# Patient Record
Sex: FEMALE | Race: BLACK OR AFRICAN AMERICAN | NOT HISPANIC OR LATINO | URBAN - METROPOLITAN AREA
[De-identification: names, ages, dates, MRNs, and addresses within clinical notes are randomized per-mention and may not be internally consistent; named-entity substitution may affect disease eponyms.]

---

## 2018-07-16 ENCOUNTER — EMERGENCY (EMERGENCY)
Facility: HOSPITAL | Age: 58
LOS: 1 days | Discharge: HOME | End: 2018-07-16
Attending: EMERGENCY MEDICINE
Payer: SELF-PAY

## 2018-07-16 VITALS — WEIGHT: 145.06 LBS

## 2018-07-16 PROCEDURE — 99284 EMERGENCY DEPT VISIT MOD MDM: CPT

## 2018-07-16 NOTE — ED ADULT TRIAGE NOTE - CHIEF COMPLAINT QUOTE
Fall 2 feet from stool in laundromat this afternoon.  Hit head on wall and landed on buttocks.  Denies LOC.  Denies blood thinners.  C/O coccyx pain.

## 2018-07-17 VITALS
TEMPERATURE: 97 F | RESPIRATION RATE: 18 BRPM | OXYGEN SATURATION: 96 % | DIASTOLIC BLOOD PRESSURE: 64 MMHG | SYSTOLIC BLOOD PRESSURE: 121 MMHG | HEART RATE: 77 BPM

## 2018-07-17 PROCEDURE — 70450 CT HEAD/BRAIN W/O DYE: CPT | Mod: 26

## 2018-07-17 PROCEDURE — 99284 EMERGENCY DEPT VISIT MOD MDM: CPT

## 2018-07-17 PROCEDURE — 72125 CT NECK SPINE W/O DYE: CPT

## 2018-07-17 PROCEDURE — 72110 X-RAY EXAM L-2 SPINE 4/>VWS: CPT | Mod: 26

## 2018-07-17 PROCEDURE — 72220 X-RAY EXAM SACRUM TAILBONE: CPT

## 2018-07-17 PROCEDURE — 72110 X-RAY EXAM L-2 SPINE 4/>VWS: CPT

## 2018-07-17 PROCEDURE — 72220 X-RAY EXAM SACRUM TAILBONE: CPT | Mod: 26

## 2018-07-17 PROCEDURE — 72125 CT NECK SPINE W/O DYE: CPT | Mod: 26

## 2018-07-17 PROCEDURE — 73080 X-RAY EXAM OF ELBOW: CPT | Mod: 26,RT

## 2018-07-17 PROCEDURE — 73080 X-RAY EXAM OF ELBOW: CPT

## 2018-07-17 PROCEDURE — 70450 CT HEAD/BRAIN W/O DYE: CPT

## 2018-07-17 RX ORDER — ACETAMINOPHEN 500 MG
975 TABLET ORAL ONCE
Qty: 0 | Refills: 0 | Status: COMPLETED | OUTPATIENT
Start: 2018-07-17 | End: 2018-07-17

## 2018-07-17 RX ORDER — METOPROLOL TARTRATE 50 MG
1 TABLET ORAL
Qty: 0 | Refills: 0 | COMMUNITY

## 2018-07-17 RX ORDER — GLIMEPIRIDE 1 MG
1 TABLET ORAL
Qty: 0 | Refills: 0 | COMMUNITY

## 2018-07-17 RX ADMIN — Medication 975 MILLIGRAM(S): at 02:06

## 2018-07-17 NOTE — ED PROVIDER NOTE - ATTENDING CONTRIBUTION TO CARE
I personally saw the patient with the PA, and completed the key components of the history and physical exam. I then discussed the management plan with the PA.   gen in nad gcs 15 resp clear cardfaci no murmur abd soft neuro: CN II - XII intact, EOMI, PERRL, no papilledema, 5/5 muscle strength x 4 extremities, no sensory deficits, 2+ dtr globally, negative babinski, no ataxic gait, normal GINA and FNT, normal romberg   + passpinal ttp   return to ed for intractable HA, persistent vomiting, or new onset motor/sensory deficits

## 2018-07-17 NOTE — ED ADULT NURSE NOTE - OBJECTIVE STATEMENT
s/p fall in the laundromat fall from 2feet height, hit her buttocks back left side,elbow and the head, denies positive loc

## 2018-07-17 NOTE — ED PROVIDER NOTE - CHPI ED SYMPTOMS NEG
no confusion/no deformity/no vomiting/no weakness/no fever/no loss of consciousness/no bleeding/no abrasion/no numbness/no tingling

## 2018-07-17 NOTE — ED PROVIDER NOTE - OBJECTIVE STATEMENT
PT is a 58y F with PMH of DM and HTN complaining of fall off bin. She states she was at the laundromat and was standing on a bin to reach the machine and the bin slipped and she fell hitting her Head and her R side (elbow). She also reports trying to turn midway in the fall and landing on her buttocks. She is ambulatory but in pain. She is complaining of pain on her R side. No LOC but she did hit the back of her head. NKDA. No other complaints. She denies any numbness/tingling/incontinence.

## 2018-07-17 NOTE — ED ADULT NURSE NOTE - PAIN: BODY LOCATION
critical value H/H noted. 5.6 patient with no h/o anemia. no history of BRBPR. call placed to  ED and [place] to try and obtain baseline H/H. no prior visits. patient denies any visits to ED recently. buttocks fecal stool positive. patient to require admission at this time for transfusion and further work up. will call transfer center. call placed to transfer center.  accepted for [facility] by Dr. Miranda Freely   patient will be admitted for blood transfusion and c-scope.